# Patient Record
Sex: MALE | Race: OTHER | HISPANIC OR LATINO | URBAN - METROPOLITAN AREA
[De-identification: names, ages, dates, MRNs, and addresses within clinical notes are randomized per-mention and may not be internally consistent; named-entity substitution may affect disease eponyms.]

---

## 2018-03-02 ENCOUNTER — EMERGENCY (EMERGENCY)
Facility: HOSPITAL | Age: 38
LOS: 1 days | Discharge: HOME | End: 2018-03-02
Admitting: EMERGENCY MEDICINE
Payer: MEDICAID

## 2018-03-02 VITALS
DIASTOLIC BLOOD PRESSURE: 74 MMHG | HEART RATE: 67 BPM | SYSTOLIC BLOOD PRESSURE: 111 MMHG | OXYGEN SATURATION: 99 % | TEMPERATURE: 98 F | RESPIRATION RATE: 20 BRPM

## 2018-03-02 DIAGNOSIS — J06.9 ACUTE UPPER RESPIRATORY INFECTION, UNSPECIFIED: ICD-10-CM

## 2018-03-02 DIAGNOSIS — Z88.0 ALLERGY STATUS TO PENICILLIN: ICD-10-CM

## 2018-03-02 DIAGNOSIS — F17.200 NICOTINE DEPENDENCE, UNSPECIFIED, UNCOMPLICATED: ICD-10-CM

## 2018-03-02 DIAGNOSIS — R05 COUGH: ICD-10-CM

## 2018-03-02 PROCEDURE — 99284 EMERGENCY DEPT VISIT MOD MDM: CPT | Mod: 25

## 2018-03-02 RX ORDER — DEXTROMETHORPHAN HYDROBROMIDE AND PROMETHAZINE HYDROCHLORIDE 15; 6.25 MG/5ML; MG/5ML
5 SYRUP ORAL
Qty: 100 | Refills: 0 | OUTPATIENT
Start: 2018-03-02 | End: 2018-03-05

## 2018-03-02 RX ORDER — FLUTICASONE PROPIONATE 50 MCG
1 SPRAY, SUSPENSION NASAL
Qty: 1 | Refills: 0 | OUTPATIENT
Start: 2018-03-02 | End: 2018-03-31

## 2018-03-02 RX ADMIN — Medication 200 MILLIGRAM(S): at 06:53

## 2018-03-02 NOTE — ED PROVIDER NOTE - PROGRESS NOTE DETAILS
EKG with benign early repol. lungs: CTAB. likely viral syndrome. no indication for CXR or antibiotics.

## 2018-03-02 NOTE — ED PROVIDER NOTE - NS ED ROS FT
· CONSTITUTIONAL: no fever and no chills.  · CARDIOVASCULAR: normal rate and rhythm, no chest pain and no edema.  · RESPIRATORY: no chest pain, + cough, and no shortness of breath.  · GASTROINTESTINAL: no abdominal pain, no bloating, no constipation, no diarrhea, no nausea and no vomiting.  · MUSCULOSKELETAL: no back pain, no musculoskeletal pain, no neck pain, and no weakness.  · SKIN: no abrasions, no jaundice, no lesions, no pruritis, and no rashes.  · NEURO: no loss of consciousness, no gait abnormality, no headache, no sensory deficits, and no weakness.  · PSYCHIATRIC: no known mental health issues.

## 2018-03-02 NOTE — ED PROVIDER NOTE - PHYSICAL EXAMINATION
VITAL SIGNS: I have reviewed nursing notes and confirm.  CONSTITUTIONAL: Well-developed; well-nourished; in no acute distress.  SKIN: Skin is warm and dry, no acute rash.  HEAD: Normocephalic; atraumatic.  EYES: PERRL, EOM intact; conjunctiva and sclera clear.  EARS: tympanic membranes clear bilaterally, No redness, normal landmarks and light reflexes, canal clear without cerumen impaction  THROAT: moist mucous membranes, normal dentition, no erythema, no swelling, no exudates, no drooling, no PTA, uvula midline  NECK: Supple; non tender.  CARD: S1, S2 normal; no murmurs, gallops, or rubs. Regular rate and rhythm.  RESP: No wheezes, rales or rhonchi.  ABD: Normal bowel sounds; soft; non-distended; non-tender; no hepatosplenomegaly.  EXT: Normal ROM. No clubbing, cyanosis or edema.  NEURO: Alert, oriented. Grossly unremarkable.  PSYCH: Cooperative, appropriate.

## 2018-03-02 NOTE — ED PROVIDER NOTE - OBJECTIVE STATEMENT
37 year old male with no PMHx presents with cough, congestion x 1 day. no fever, no chills.   has not tried anything for his symptoms.   requesting EKG, no chest pain, but states in the past has had swelling around his heart. Denies fever, chills, palpitations, diaphoresis, LOPEZ, SOB, orthopnea, hemoptysis, wheezing, peripheral edema, focal weakness, numbness, tingling, paresthesia, HA, dizziness, neck pain, N/V/D/C, abdominal pain, change in urinary/bowel function, trauma, fall, rash, and malaise. 37 year old male with no PMHx presents with cough, congestion x 1 day. no fever, no chills.   has not tried anything for his symptoms. no chest pain. Denies fever, chills, palpitations, diaphoresis, LOPEZ, SOB, orthopnea, hemoptysis, wheezing, peripheral edema, focal weakness, numbness, tingling, paresthesia, HA, dizziness, neck pain, N/V/D/C, abdominal pain, change in urinary/bowel function, trauma, fall, rash, and malaise.

## 2018-03-02 NOTE — ED ADULT TRIAGE NOTE - CHIEF COMPLAINT QUOTE
walkin patient with complaints of soreness of chest, ribs, back while coughing. Reports has been coughing for a 'few days.' No expectorate

## 2018-03-23 ENCOUNTER — EMERGENCY (EMERGENCY)
Facility: HOSPITAL | Age: 38
LOS: 1 days | Discharge: HOME | End: 2018-03-23
Attending: EMERGENCY MEDICINE | Admitting: EMERGENCY MEDICINE
Payer: MEDICAID

## 2018-03-23 VITALS
HEART RATE: 87 BPM | OXYGEN SATURATION: 100 % | TEMPERATURE: 98 F | RESPIRATION RATE: 16 BRPM | SYSTOLIC BLOOD PRESSURE: 128 MMHG | DIASTOLIC BLOOD PRESSURE: 79 MMHG

## 2018-03-23 DIAGNOSIS — K13.79 OTHER LESIONS OF ORAL MUCOSA: ICD-10-CM

## 2018-03-23 DIAGNOSIS — Z88.0 ALLERGY STATUS TO PENICILLIN: ICD-10-CM

## 2018-03-23 DIAGNOSIS — F17.200 NICOTINE DEPENDENCE, UNSPECIFIED, UNCOMPLICATED: ICD-10-CM

## 2018-03-23 DIAGNOSIS — K13.0 DISEASES OF LIPS: ICD-10-CM

## 2018-03-23 DIAGNOSIS — Z79.899 OTHER LONG TERM (CURRENT) DRUG THERAPY: ICD-10-CM

## 2018-03-23 PROCEDURE — 99282 EMERGENCY DEPT VISIT SF MDM: CPT

## 2018-03-23 NOTE — ED PROVIDER NOTE - OBJECTIVE STATEMENT
37y M with no pertinent PMHx presents to ED for lip pain. Pt states a "pimple" developed in the crack on the left side between his top and bottom lip a few days ago. Pt states it has been popping frequently, causing a burning type of pain. He has been applying white petroleum jelly to the wound, with no relief of symptoms. He had a similar pimple on the right side of his face last year and was concerned for herpes, however he tested negative. Pt notes he has an upcoming appointment with a dermatologist for possible eczema on his elbows and knees.  Admits to frequent marijuana use, regular EtOH use and occasional tobacco use,

## 2018-03-23 NOTE — ED PROVIDER NOTE - MEDICAL DECISION MAKING DETAILS
Treat for angular chelosis.  No signs of infection.  Oral vitamin B, proper cleaning, and ointment.  States that he has follow up with dermatologist next week.  Conservative management discussed with the patient in detail.  Close PMD follow up encouraged.  Strict ED return instructions discussed in detail and patient given the opportunity to ask any questions about their discharge diagnosis and instructions

## 2024-11-18 NOTE — ED PROVIDER NOTE - CHPI ED SYMPTOMS POS
Physical Therapy    Visit Type: treatment and discharge  SUBJECTIVE  Patient in chair upon arrival. \"I am going home today.\"  Patient / Family Goal: maximize function and return home    Pain   Patient denies pain.     OBJECTIVE     Cognitive Status   Level of Consciousness   - alert  Orientation    - Oriented to: person, place, time and situation  Functional Communication   - Forms of Communication: verbal and delayed responses  Attention Span    - Attention: - attends with cues to redirect   - Attention impairment: distractibility  Following Direction   - follows multistep commands with increased time  Transition Between Tasks   - transitions with cues  Safety Awareness/Insight   - impaired    Patient Activity Tolerance: 2 to 1 activity to rest    Range of Motion (ROM)   (degrees unless noted; active unless noted; norms in ( ); negative=lacking to 0, positive=beyond 0)  WFL: LLE, RLE    Strength  (out of 5 unless noted, standard test position unless noted)   WFL: LLE, RLE    Sitting Balance  (GLADIS = base of support)  Static      - Trial 1 details: supervision and with back unsupported  Dynamic      - Trial 1 details: supervision and with back unsupported    Standing Balance  (GLADIS = base of support)  Firm Surface: Double Leg      - Static, Eyes Open       - Trial 1 details: without UE support and stand by assist     - Dynamic, Eyes Open       - Trial 1 details: without UE support, stand by assist and supervision    Balance Tests   6 Minute Walk Test      - Assistive device: gait belt     - Distance: 1071 feet     Norms: 70-79 years- male 3713-2050 feet, female 0582-1582 feet  33/56 on Eason Balance Test- initial assessment   49/56 on Eason Balance Test- completed 11/14     Bed Mobility  - Rolling left: independent  - Rolling right: independent  - Supine to sit: independent  - Sit to supine: independent  HOB flat, no use of railings.   Transfers  Assistive devices: none, gait belt  - Sit to stand: independent  - Stand  to sit: independent  - Stand pivot: independent  - Car: independent  No assist needed.    Ambulation / Gait  - Assistive device: gait belt  - Distance (feet unless otherwise indicated): 150, 175  - Assist Level: independent  - Surface: even  - Description: step through    Steadiness improving but slow pace. No instances of L foot drag this session.  IRP Ambulation/Stairs  - Ambulate 10 feet: independent  - Ambulate 50 feet with 2 turns: independent  - Ambulate 150 feet: independent  - Ambulate on uneven surface: independent  - Object retrieval from floor: independent  - 1 step (curb): independent  - 4 steps: supervision  - 12 steps: supervision  Stair Ambulation  - Number of steps: 14;   - Assist Level: supervision  - Railing: right  - Pattern: reciprocal and step-to  - Devices Used: gait belt  Able to ascend stair reciprocally without use of BUEs. Step to pattern descending with use of R railing.   Curb Step Ambulation  - Assist Level: independent  - Pattern: ascend right and descend left  - Railing: none  - Assistive Device: gait belt  - Description: decreased speed  Good steadiness and sequencing.       Interventions  Therapeutic Exercise  Nustep at level 3 resistance x 15 minutes    Reviewed stroke education, fall prevention education, and finalized HEP. Handouts provided and pt provided with understanding.   Skilled input: tactile instruction/cues, verbal instruction/cues and as detailed above  Verbal Consent: Writer verbally educated and received verbal consent for hand placement, positioning of patient, and techniques to be performed today from patient for clothing adjustments for techniques, hand placement and palpation for techniques and therapist position for techniques as described above and how they are pertinent to the patient's plan of care.  Home Exercise Program/Education Materials: Access Code: [email or web address]  URL: [email or web address]  Date: 11/18/2024  Prepared by: Yasmin  Eliud    Exercises  - Sit to Stand with Arms Crossed  - 1 x daily - 7 x weekly - 3 sets - 10 reps  - Heel Raises with Counter Support  - 1 x daily - 7 x weekly - 3 sets - 10 reps  - Standing Hip Abduction with Counter Support  - 1 x daily - 7 x weekly - 3 sets - 10 reps  - Standing Hip Extension with Counter Support  - 1 x daily - 7 x weekly - 3 sets - 10 reps  - Standing March with Counter Support  - 1 x daily - 7 x weekly - 3 sets - 10 reps  - Mini Squat with Counter Support  - 1 x daily - 7 x weekly - 3 sets - 10 reps  - Side Stepping with Counter Support  - 1 x daily - 7 x weekly - 3 sets - 10 reps         ASSESSMENT   The patient has participated in skilled PT services on IRP and at this time is being discharged to home with assist from spouse.  The patient's therapy goals were reassessed this date and the patient has met the goals. The patient's equipment needs were addressed and the patient is being discharged with no mobility equipment.    Recommendations from PT include continued rehab with outpatient physical therapy services.  The patient would benefit from continued PT to address high level, dynamic balance with a good prognosis to meet future therapy goals. The plan has been discussed with the patient and the patient verbalized agreement.  Discharge inpatient rehabilitation PT.             Discharge Recommendations  Recommendation for Discharge Location: PT WI: Outpatient Therapy  PT/OT Mobility Equipment for Discharge: none  PT/OT ADL Equipment for Discharge: shower chair  PT Identified Barriers to Discharge: fall risk, stairs, cognition        Progress: goals met    Therapy Participation: This patient participated in all scheduled physical therapy time this session.    Education:   - Present and ready to learn: patient  Education provided during session:  - Results of above outlined education: Verbalizes understanding and Demonstrates understanding    Patient at End of Session:   Location: in  chair  Safety measures: call light within reach and equipment intact  Handoff to: nurse    PLAN   Suggestions for next session as indicated: DC PT  Interventions: balance, gait training, strengthening, neuromuscular re-education, bed mobility, continued evaluation, equipment eval/education, HEP train/position, patient/family training, safety education, stairs retraining, functional transfer training and endurance training  Agreement to plan and goals: patient agrees with goals and treatment plan      GOALS  Review date: 11/19/2024  Short Term Goals (STGs): to be met 7 days from date established, unless otherwise stated.   - Patient will complete all bed mobility at contact guard assist level   - Patient will perform sit to/from stand at contact guard assist level   - Patient will perform stand pivot transfers at contact guard assist level with least restrictive assistive device   - Patient will ambulate 150 feet at contact guard assist level with least restrictive assistive device   - Patient will negotiate 12 stairs at contact guard assist level with least restrictive assistive device and 1 railings  Status: all STGs met  Long Term Goals (LTGs): to be met by discharge from rehab program.   - Patient will complete all bed mobility at modified Independent level   - Patient will perform sit to/from stand at modified Independent level   - Patient will perform stand pivot transfers at modified Independent level with least restrictive assistive device   - Patient will ambulate 150 feet at modified Independent level with least restrictive assistive device   - Patient will negotiate 12 stairs at supervision level with least restrictive assistive device and 1 railings  - Patient will be independent with written HEP.  - Patient will complete fall prevention education.  - Patient will complete stroke education.   Status: all LTGs met  Documented in the chart in the following areas: Assessment/Plan.      Therapy procedure time  and total treatment time can be found documented on the Time Entry flowsheet   PAIN